# Patient Record
Sex: FEMALE | Race: BLACK OR AFRICAN AMERICAN | NOT HISPANIC OR LATINO | Employment: FULL TIME | URBAN - METROPOLITAN AREA
[De-identification: names, ages, dates, MRNs, and addresses within clinical notes are randomized per-mention and may not be internally consistent; named-entity substitution may affect disease eponyms.]

---

## 2019-02-22 ENCOUNTER — HOSPITAL ENCOUNTER (OUTPATIENT)
Dept: RADIOLOGY | Facility: OTHER | Age: 34
Discharge: HOME | End: 2019-02-22
Attending: FAMILY MEDICINE
Payer: COMMERCIAL

## 2019-02-22 ENCOUNTER — OFFICE VISIT (OUTPATIENT)
Dept: INTERNAL MEDICINE | Facility: CLINIC | Age: 34
End: 2019-02-22
Payer: COMMERCIAL

## 2019-02-22 VITALS
HEART RATE: 89 BPM | WEIGHT: 206.38 LBS | BODY MASS INDEX: 31.28 KG/M2 | OXYGEN SATURATION: 100 % | DIASTOLIC BLOOD PRESSURE: 100 MMHG | SYSTOLIC BLOOD PRESSURE: 150 MMHG | HEIGHT: 68 IN

## 2019-02-22 DIAGNOSIS — Z13.220 SCREENING FOR LIPID DISORDERS: ICD-10-CM

## 2019-02-22 DIAGNOSIS — M25.562 CHRONIC PAIN OF LEFT KNEE: Primary | ICD-10-CM

## 2019-02-22 DIAGNOSIS — M25.462 SWELLING OF KNEE JOINT, LEFT: ICD-10-CM

## 2019-02-22 DIAGNOSIS — M25.562 CHRONIC PAIN OF LEFT KNEE: ICD-10-CM

## 2019-02-22 DIAGNOSIS — R03.0 ELEVATED BLOOD PRESSURE READING IN OFFICE WITHOUT DIAGNOSIS OF HYPERTENSION: ICD-10-CM

## 2019-02-22 DIAGNOSIS — E66.09 CLASS 1 OBESITY DUE TO EXCESS CALORIES WITH BODY MASS INDEX (BMI) OF 31.0 TO 31.9 IN ADULT, UNSPECIFIED WHETHER SERIOUS COMORBIDITY PRESENT: ICD-10-CM

## 2019-02-22 DIAGNOSIS — G89.29 CHRONIC PAIN OF LEFT KNEE: ICD-10-CM

## 2019-02-22 DIAGNOSIS — G89.29 CHRONIC PAIN OF LEFT KNEE: Primary | ICD-10-CM

## 2019-02-22 DIAGNOSIS — Z13.1 SCREENING FOR DIABETES MELLITUS: ICD-10-CM

## 2019-02-22 DIAGNOSIS — Z13.9 ENCOUNTER FOR HEALTH-RELATED SCREENING: ICD-10-CM

## 2019-02-22 PROBLEM — E66.811 CLASS 1 OBESITY DUE TO EXCESS CALORIES WITH BODY MASS INDEX (BMI) OF 31.0 TO 31.9 IN ADULT: Status: ACTIVE | Noted: 2019-02-22

## 2019-02-22 PROCEDURE — 3008F PR BODY MASS INDEX (BMI) DOCUMENTED: ICD-10-PCS | Mod: CPTII,S$GLB,, | Performed by: FAMILY MEDICINE

## 2019-02-22 PROCEDURE — 99214 OFFICE O/P EST MOD 30 MIN: CPT | Mod: S$GLB,,, | Performed by: FAMILY MEDICINE

## 2019-02-22 PROCEDURE — 99999 PR PBB SHADOW E&M-EST. PATIENT-LVL IV: CPT | Mod: PBBFAC,,, | Performed by: FAMILY MEDICINE

## 2019-02-22 PROCEDURE — 73562 XR KNEE 3 VIEW LEFT: ICD-10-PCS | Mod: 26,LT,, | Performed by: RADIOLOGY

## 2019-02-22 PROCEDURE — 73562 X-RAY EXAM OF KNEE 3: CPT | Mod: TC,FY,LT

## 2019-02-22 PROCEDURE — 99214 PR OFFICE/OUTPT VISIT, EST, LEVL IV, 30-39 MIN: ICD-10-PCS | Mod: S$GLB,,, | Performed by: FAMILY MEDICINE

## 2019-02-22 PROCEDURE — 99999 PR PBB SHADOW E&M-EST. PATIENT-LVL IV: ICD-10-PCS | Mod: PBBFAC,,, | Performed by: FAMILY MEDICINE

## 2019-02-22 PROCEDURE — 73562 X-RAY EXAM OF KNEE 3: CPT | Mod: 26,LT,, | Performed by: RADIOLOGY

## 2019-02-22 PROCEDURE — 3008F BODY MASS INDEX DOCD: CPT | Mod: CPTII,S$GLB,, | Performed by: FAMILY MEDICINE

## 2019-02-22 NOTE — PROGRESS NOTES
"Subjective:      Patient ID: Cindy Kiran is a 33 y.o. female.    Chief Complaint: Knee Pain    HPI  This patient is new to me.   Cindy Kiran is a 33 y.o. year old female with obesity who presents today to establish care and complains of left knee pain.     Complains of left knee pain since 2018. No inciting event. Gets swollen periodically. Sometimes shoots to back of knee and to calf. Rest makes it better. Takes Tylenol every night to help. No other joints involved.   Hurts even after rest. Some stiffness.   Also noticed "knot" on knee - lateral aspect.   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   A little popping or clicking when squatting.     Exercise - stopped due to knee. Used to do elliptical, light weights    OB/GYN History     LMP: regular with pill  Sexually active:  Contraception: OCP  Last Pap smear: 2 year ago - normal. Goes to [facility]    I personally reviewed Past Medical History, Past Surgical History, Social History, and Family History    Review of Systems   Constitutional: Negative for chills, fatigue, fever and unexpected weight change.   HENT: Negative for congestion, hearing loss, rhinorrhea and sore throat.    Eyes: Negative for visual disturbance.   Respiratory: Negative for cough, shortness of breath and wheezing.    Cardiovascular: Negative for chest pain, palpitations and leg swelling.   Gastrointestinal: Negative for abdominal pain, constipation, diarrhea, nausea and vomiting.   Genitourinary: Negative for dysuria, frequency, menstrual problem and urgency.   Musculoskeletal: Positive for arthralgias. Negative for myalgias.   Skin: Negative for rash.   Neurological: Negative for dizziness, syncope and headaches.   Psychiatric/Behavioral: Negative for dysphoric mood and sleep disturbance. The patient is not nervous/anxious.        Objective:      Vitals:    19 0939 19 1006   BP: (!) 144/106 (!) 150/100   Pulse: 89    SpO2: 100%    Weight: 93.6 kg " "(206 lb 5.6 oz)    Height: 5' 7.5" (1.715 m)      Physical Exam   Constitutional: She is oriented to person, place, and time. She appears well-developed. No distress.   obese   HENT:   Head: Normocephalic and atraumatic.   Right Ear: Hearing normal.   Left Ear: Hearing normal.   Nose: Nose normal.   Mouth/Throat: Oropharynx is clear and moist and mucous membranes are normal. No oropharyngeal exudate.   Eyes: Conjunctivae and lids are normal. Pupils are equal, round, and reactive to light.   Neck: Normal range of motion. No thyroid mass and no thyromegaly present.   Cardiovascular: Normal rate, regular rhythm, S1 normal, S2 normal and intact distal pulses.   No murmur heard.  No lower extremity edema.    Pulmonary/Chest: Effort normal and breath sounds normal. No respiratory distress.   Abdominal: Soft. Normal appearance and bowel sounds are normal. There is no tenderness.   Musculoskeletal:        Left knee: She exhibits normal range of motion, no swelling, no effusion, no deformity, no LCL laxity, normal patellar mobility and no MCL laxity. No tenderness found. No medial joint line and no lateral joint line tenderness noted.   Some crepitus present in left knee   Lymphadenopathy:     She has no cervical adenopathy.        Right: No supraclavicular adenopathy present.        Left: No supraclavicular adenopathy present.   Neurological: She is alert and oriented to person, place, and time.   Skin: Skin is warm and dry. No rash noted.   Psychiatric: She has a normal mood and affect. Her behavior is normal. Thought content normal.   Nursing note and vitals reviewed.      Assessment:       1. Chronic pain of left knee    2. Swelling of knee joint, left    3. Elevated blood pressure reading in office without diagnosis of hypertension    4. Encounter for health-related screening    5. Screening for lipid disorders    6. Screening for diabetes mellitus    7. Class 1 obesity due to excess calories with body mass index (BMI) " of 31.0 to 31.9 in adult, unspecified whether serious comorbidity present        Plan:     Chronic pain of left knee  Swelling of knee joint, left        - Will order imaging of left knee to eval. Likely due to OA likely secondary to obesity. Will rule out rheum or inflammatory causes with labs as below. Will discuss treatment options with patient based on results.   -     X-Ray Knee 3 View Left; Future;   -     TATE Screen w/Reflex; Future;   -     C-reactive protein; Future;   -     Sedimentation rate; Future;  -     Rheumatoid factor; Future;   -     Cyclic citrul peptide antibody, IgG; Future;  -     Uric acid; Future;     Elevated blood pressure reading in office without diagnosis of hypertension       - Patient will return in 2 weeks for nurse visit to recheck BP.    Encounter for health-related screening  -     CBC auto differential; Future;   -     Comprehensive metabolic panel; Future;   -     Lipid panel; Future;   -     TSH; Future;     Screening for lipid disorders  -     Lipid panel; Future;     Screening for diabetes mellitus  -     Comprehensive metabolic panel; Future    Class 1 obesity due to excess calories with body mass index (BMI) of 31.0 to 31.9 in adult, unspecified whether serious comorbidity present        - Discussed the importance of weight loss by making healthy dietary changes and increasing physical activity.

## 2019-02-25 ENCOUNTER — PATIENT MESSAGE (OUTPATIENT)
Dept: INTERNAL MEDICINE | Facility: CLINIC | Age: 34
End: 2019-02-25

## 2019-02-25 DIAGNOSIS — R77.9 ELEVATED SERUM PROTEIN LEVEL: Primary | ICD-10-CM

## 2019-02-25 DIAGNOSIS — E05.90 SUBCLINICAL HYPERTHYROIDISM: ICD-10-CM

## 2019-02-25 DIAGNOSIS — E78.00 PURE HYPERCHOLESTEROLEMIA: ICD-10-CM

## 2019-02-25 RX ORDER — DICLOFENAC SODIUM 10 MG/G
4 GEL TOPICAL 4 TIMES DAILY PRN
Qty: 100 G | Refills: 0 | Status: SHIPPED | OUTPATIENT
Start: 2019-02-25

## 2019-02-25 NOTE — TELEPHONE ENCOUNTER
Thyroid - subclinical hyperthyroid - will repeat in 2 months     Protein elevated - will need follow-up CMP when above labs done    Hyperlipidemia - discussed lifestyle changes with patient over phone.    Knee better, but still bothering her. She is using brace at work and ice. Will try voltaren gel.  ADvised to contact me if patient continues or worsens.

## 2019-02-26 NOTE — TELEPHONE ENCOUNTER
Called pt and remind her that hse needs to do her labs two months from the last time she did her labs

## 2019-03-15 ENCOUNTER — CLINICAL SUPPORT (OUTPATIENT)
Dept: INTERNAL MEDICINE | Facility: CLINIC | Age: 34
End: 2019-03-15
Payer: COMMERCIAL

## 2019-03-15 VITALS — DIASTOLIC BLOOD PRESSURE: 80 MMHG | OXYGEN SATURATION: 98 % | HEART RATE: 83 BPM | SYSTOLIC BLOOD PRESSURE: 138 MMHG

## 2019-03-15 PROCEDURE — 99999 PR PBB SHADOW E&M-EST. PATIENT-LVL II: ICD-10-PCS | Mod: PBBFAC,,,

## 2019-03-15 PROCEDURE — 99999 PR PBB SHADOW E&M-EST. PATIENT-LVL II: CPT | Mod: PBBFAC,,,

## 2019-03-15 RX ORDER — ACETAMINOPHEN AND CODEINE PHOSPHATE 120; 12 MG/5ML; MG/5ML
1 SOLUTION ORAL DAILY
COMMUNITY

## 2019-03-15 NOTE — Clinical Note
Does patient have record of home blood pressure readings no. Patient is asymptomatic. Pt states her OBGYN changed her birth control medication because she states it could have been causing her elevated b/p.BP: 138/80 ,  83 HR.Dr. Duvall notified.

## 2019-03-15 NOTE — PROGRESS NOTES
Cindy Kiran 34 y.o. female is here today for Blood Pressure check.   History of HTN no.    Review of patient's allergies indicates:  No Known Allergies  Creatinine   Date Value Ref Range Status   02/22/2019 0.8 0.5 - 1.4 mg/dL Final     Sodium   Date Value Ref Range Status   02/22/2019 139 136 - 145 mmol/L Final     Comment:     Specimen slightly Hemolyzed.     Potassium   Date Value Ref Range Status   02/22/2019 4.1 3.5 - 5.1 mmol/L Final   ]  Patient denies taking blood pressure medications on a regular bases at the same time of the day.     Current Outpatient Medications:     diclofenac sodium (VOLTAREN) 1 % Gel, Apply 4 g topically 4 (four) times daily as needed. To knee, Disp: 100 g, Rfl: 0    UNABLE TO FIND, 1 tablet. medication name: Birth control, Disp: , Rfl:   Does patient have record of home blood pressure readings no.   Patient is asymptomatic.   Pt states her OBGYN changed her birth control medication because she states it could have been causing her elevated b/p.  Pt cut out pork and beef out of diet.    BP: 138/80 ,  83 HR.    Dr. Duvall notified.

## 2019-08-26 ENCOUNTER — HOSPITAL ENCOUNTER (EMERGENCY)
Facility: OTHER | Age: 34
Discharge: HOME | End: 2019-08-26
Attending: EMERGENCY MEDICINE
Payer: COMMERCIAL

## 2019-08-26 VITALS
DIASTOLIC BLOOD PRESSURE: 108 MMHG | BODY MASS INDEX: 31.37 KG/M2 | RESPIRATION RATE: 18 BRPM | SYSTOLIC BLOOD PRESSURE: 181 MMHG | WEIGHT: 207 LBS | OXYGEN SATURATION: 100 % | TEMPERATURE: 98 F | HEIGHT: 68 IN | HEART RATE: 88 BPM

## 2019-08-26 DIAGNOSIS — R59.0 SUBMENTAL LYMPHADENOPATHY: Primary | ICD-10-CM

## 2019-08-26 LAB
B-HCG UR QL: NEGATIVE
CTP QC/QA: YES
DEPRECATED S PYO AG THROAT QL EIA: NEGATIVE

## 2019-08-26 PROCEDURE — 87081 CULTURE SCREEN ONLY: CPT

## 2019-08-26 PROCEDURE — 87880 STREP A ASSAY W/OPTIC: CPT

## 2019-08-26 PROCEDURE — 99283 EMERGENCY DEPT VISIT LOW MDM: CPT

## 2019-08-26 PROCEDURE — 81025 URINE PREGNANCY TEST: CPT | Performed by: EMERGENCY MEDICINE

## 2019-08-26 RX ORDER — IBUPROFEN 600 MG/1
600 TABLET ORAL EVERY 6 HOURS PRN
Qty: 20 TABLET | Refills: 0 | Status: SHIPPED | OUTPATIENT
Start: 2019-08-26

## 2019-08-26 NOTE — ED PROVIDER NOTES
Encounter Date: 8/26/2019       History     Chief Complaint   Patient presents with    Mass     reports lump to upper neck since this morning. reports throat begining to get sore. denies any difficutly swallowing or SOB.     34-year-old female with no significant past medical history presents emergency department with complaints of a mass below her chin.  She states that she noticed it today around 1:30 p.m..  She states it feels like it may be sore.  She denies difficulty swallowing, trauma, injury, fever, chills, dental pain or sore throat. She reports some mild pain at a 3/10.  No current treatment for her symptoms.    The history is provided by the patient and a parent.     Review of patient's allergies indicates:  No Known Allergies  Past Medical History:   Diagnosis Date    Obesity      Past Surgical History:   Procedure Laterality Date    UMBILICAL HERNIA REPAIR  1990     Family History   Problem Relation Age of Onset    Diabetes Mother     Hypertension Mother     Hypertension Father     Cancer Paternal Grandfather         lung, + smoker    Asthma Sister     Diabetes Maternal Grandmother     Alzheimer's disease Paternal Grandmother     Hypertension Sister      Social History     Tobacco Use    Smoking status: Never Smoker    Smokeless tobacco: Never Used   Substance Use Topics    Alcohol use: Yes     Frequency: 2-4 times a month     Drinks per session: 1 or 2    Drug use: No     Review of Systems   Constitutional: Negative for chills and fever.   HENT: Negative for congestion, dental problem, ear pain, facial swelling, mouth sores, sore throat and trouble swallowing.         Mass below chin   Respiratory: Negative for cough and shortness of breath.    Cardiovascular: Negative for chest pain.   Gastrointestinal: Negative for nausea and vomiting.   Skin: Negative for rash.   Neurological: Negative for weakness.   Hematological: Does not bruise/bleed easily.       Physical Exam     Initial Vitals  [08/26/19 1504]   BP Pulse Resp Temp SpO2   (!) 181/108 88 18 98 °F (36.7 °C) 100 %      MAP       --         Physical Exam    Nursing note and vitals reviewed.  Constitutional: She appears well-developed and well-nourished. She is not diaphoretic.  Non-toxic appearance. No distress.   HENT:   Head: Normocephalic and atraumatic.   Right Ear: Tympanic membrane, external ear and ear canal normal. No middle ear effusion.   Left Ear: Tympanic membrane, external ear and ear canal normal.  No middle ear effusion.   Nose: Nose normal. No mucosal edema or rhinorrhea. Right sinus exhibits no maxillary sinus tenderness and no frontal sinus tenderness. Left sinus exhibits no maxillary sinus tenderness and no frontal sinus tenderness.   Mouth/Throat: Uvula is midline, oropharynx is clear and moist and mucous membranes are normal. Mucous membranes are not dry. No trismus in the jaw. No dental abscesses or uvula swelling. No oropharyngeal exudate, posterior oropharyngeal edema, posterior oropharyngeal erythema or tonsillar abscesses.   Significant amount of plaque noted to the lower teeth.  No tenderness palpation to the teeth.  No gingival inflammation or drainable abscess noted. No lingual elevation or drooling.  No posterior oropharynx erythema, edema, abscess or exudate.  She does have a firm mobile lesion that measures approximately 5 mm in diameter noted in the submental region consistent with lymphadenopathy   Eyes: Conjunctivae and lids are normal. No scleral icterus.   Neck: Normal range of motion and phonation normal. Neck supple.   Abdominal: Normal appearance.   Musculoskeletal: Normal range of motion.   No obvious deformities, moving all extremities, normal gait   Lymphadenopathy:   Submental lymphadenopathy   Neurological: She is alert and oriented to person, place, and time. She has normal strength. No sensory deficit.   Skin: Skin is warm, dry and intact. Capillary refill takes less than 2 seconds. No lesion and  no rash noted. No erythema.   Psychiatric: She has a normal mood and affect. Her speech is normal and behavior is normal. Judgment normal. Cognition and memory are normal.         ED Course   Procedures  Labs Reviewed   THROAT SCREEN, RAPID   CULTURE, STREP A,  THROAT   POCT URINE PREGNANCY          Imaging Results    None          Medical Decision Making:   History:   Old Medical Records: I decided to obtain old medical records.  Initial Assessment:   34-year-old female with complaints consistent with submental lymphadenopathy.  Afebrile neurovascularly intact.  She is alert and healthy and nontoxic appearing.  She is not distressed.  No focal neurological deficits.  Exam documented above.  She has a firm mobile lymph node that measures approximately 5 mm in diameter to the submental region.  Mildly tender.  No evidence of serious bacterial infection.  No drainable abscess or surrounding cellulitis.  No evidence of dental abscess, Michael's angina, signs or symptoms of retropharyngeal abscess.  She denies any sore throat and there is no posterior oropharynx erythema, edema or exudate. Lymph no may be reactive due to dental infection however nothing that indicates antibiotics at this time.  She does have significant amount of plaque noted to the lower teeth.  Clinical Tests:   Lab Tests: Reviewed and Ordered  ED Management:  Rapid strep and UPT both negative. No further emergent workup felt indicated.  She is urged close follow-up with both her primary care physician as well as dental clinic in the next 48 hr or return to the emergency department for any worsening signs or symptoms. She states understanding agrees this plan.  This is the extent of patient's complaints today.  This note was created using [employer] dictation.  There may be typographical errors secondary to dictation.                     ED Course as of Aug 26 1703   Mon Aug 26, 2019   1617 Cindy Kiran, 34 y.o.  presented to the ED with  c/o submental lump with concerns for swelling. States that her symptom started around 1:30Pm. Denies ST, fever, or difficulty swallowing.     Patient seen and medically screened by the Physician in Triage due to ED crowding.  Appropriate tests and/or medications ordered.  A medical screening exam has been performed.  The care will be assumed by myself or another provider when treatment space becomes available.  I am not assuming care of this patient at this time. 4:17 PM. ADAM KRAUSE        [FC]      ED Course User Index  [FC] Maria Gómez PA-C     Clinical Impression:     1. Submental lymphadenopathy            Disposition:   Disposition: Discharged  Condition: Stable                        Maria Gómez PA-C  08/26/19 0164

## 2019-08-26 NOTE — ED NOTES
Pt to ED c/o pain located underneath chin x today; denies fever, chills, sore throat or any other symptoms. Minimal TTP underneath chin upon palpation by PA-C, no obvious deformities noted. Pt AAOx4 and appropriate at this time. Respirations even and unlabored. No acute distress noted.

## 2019-08-26 NOTE — ED NOTES
Appearance: Pt awake, alert & oriented to person, place & time. Pt in no acute distress at present time. Pt is clean and well groomed with clothes appropriately fastened.   Skin: Skin warm, dry & intact. Color consistent with ethnicity. Mucous membranes moist. No breakdown or brusing noted.   Musculoskeletal: Patient moving all extremities well, no obvious swelling or deformities noted.   Respiratory: Respirations spontaneous, even, and non-labored. Visible chest rise noted. Airway is open and patent. No accessory muscle use noted.   Neurologic: Sensation is intact. Speech is clear and appropriate. Eyes open spontaneously, behavior appropriate to situation, follows commands,  purposeful motor response noted.   Cardiac:  No Bilateral lower extremity edema. Cap refill is <3 seconds. Pt denies active chest pains, SOB, dizziness, blurred vision, weakness or fatigue at this time.   Abdomen: Pt denies active abd pains, cramping or discomfort, No N/V/D at this time.   : Pt reports no dysuria or hematuria.

## 2019-08-26 NOTE — DISCHARGE INSTRUCTIONS
Apply warm compresses to the area.  Take anti-inflammatories as prescribed.  Follow up with both her primary care physician as well as dental Clinic.  Return to the emergency department for any worsening signs or symptoms otherwise.

## 2019-08-28 LAB — BACTERIA THROAT CULT: NORMAL

## 2021-04-16 ENCOUNTER — PATIENT MESSAGE (OUTPATIENT)
Dept: RESEARCH | Facility: HOSPITAL | Age: 36
End: 2021-04-16

## 2021-07-15 ENCOUNTER — PATIENT MESSAGE (OUTPATIENT)
Dept: INTERNAL MEDICINE | Facility: CLINIC | Age: 36
End: 2021-07-15

## 2021-08-20 ENCOUNTER — TELEPHONE (OUTPATIENT)
Dept: INTERNAL MEDICINE | Facility: CLINIC | Age: 36
End: 2021-08-20

## 2024-10-04 ENCOUNTER — CLINICAL SUPPORT (OUTPATIENT)
Dept: OTHER | Facility: CLINIC | Age: 39
End: 2024-10-04

## 2024-10-04 DIAGNOSIS — Z00.8 ENCOUNTER FOR OTHER GENERAL EXAMINATION: ICD-10-CM

## 2024-10-05 VITALS
SYSTOLIC BLOOD PRESSURE: 136 MMHG | DIASTOLIC BLOOD PRESSURE: 92 MMHG | BODY MASS INDEX: 36.1 KG/M2 | HEIGHT: 67 IN | WEIGHT: 230 LBS

## 2024-10-05 LAB
HDLC SERPL-MCNC: 70 MG/DL
POC CHOLESTEROL, LDL (DOCK): 154 MG/DL
POC CHOLESTEROL, TOTAL: 240 MG/DL
POC GLUCOSE, FASTING: 93 MG/DL (ref 60–110)
TRIGL SERPL-MCNC: 94 MG/DL